# Patient Record
Sex: MALE
[De-identification: names, ages, dates, MRNs, and addresses within clinical notes are randomized per-mention and may not be internally consistent; named-entity substitution may affect disease eponyms.]

---

## 2018-11-12 ENCOUNTER — HOSPITAL ENCOUNTER (OUTPATIENT)
Dept: HOSPITAL 89 - OR | Age: 53
Discharge: HOME | End: 2018-11-12
Attending: UROLOGY
Payer: COMMERCIAL

## 2018-11-12 VITALS — BODY MASS INDEX: 24.29 KG/M2 | WEIGHT: 164 LBS | HEIGHT: 69 IN

## 2018-11-12 VITALS — DIASTOLIC BLOOD PRESSURE: 101 MMHG | SYSTOLIC BLOOD PRESSURE: 159 MMHG

## 2018-11-12 DIAGNOSIS — N20.0: Primary | ICD-10-CM

## 2018-11-12 PROCEDURE — 50590 FRAGMENTING OF KIDNEY STONE: CPT

## 2018-11-13 NOTE — OPERATIVE REPORT 1
EVENT DATE: November 12, 2018 

SURGEON: Teo Siddiqui MD 

ANESTHESIOLOGIST: Kartik Cain MD 

ANESTHESIA: General.  





PREOPERATIVE DIAGNOSIS  

Right renal lithiasis x3.  



POSTOPERATIVE DIAGNOSIS 

Right renal lithiasis x3.  



PROCEDURE PERFORMED 

Right extracorporeal shockwave lithotripsy with treatment of three stone foci.  



DESCRIPTION OF PROCEDURE 

Under general anesthetic, the patient was positioned for right extracorporeal 

shockwave lithotripsy.  



After localization by fluoroscopy, the approximately 5 mm stone in the inferior 

pole area of the right kidney was treated with a total of approximately 500 

shocks, and IV contrast was administered to light up the kidney collecting 

system.  



That was accomplished.  



The contrast showed treatment in the appropriate area for the largest stone 

located in the inferior pole area of the right kidney.  



A total of 1500 shocks were administered to the stone.  



After administration of the contrast media and confirmation of the inferior pole

area, the smaller stones in the upper pole and midpole area of the right kidney 

were treated with a total of 1500 shocks, progressing from low to high kV fairly

rapidly.  



Patient tolerated the procedure satisfactorily and returned to the recovery room

in satisfactory condition.  



This is a 53-year-old white male complaining of right flank pain on 2 November 2018.  CT showed right renal lithiasis x3 with no associated hydronephrosis.  



Patient believes that he passed the stone.  The emergency room told him he 

probably had a viral gastroenteritis.  Urine culture showed regrowth.  



Urinalysis in the office showed no blood on 6 November 2018.  



Historically, Dr. Coles, urologist in [place], treated him for a stone at the 

hospital in [place].  



Patient had a great deal of difficulty tolerating his stent.  



Patient's white count at that time, on 2 November, was 7100.  GFR was greater 

than 60.  Calcium was 9.7.  



Patient was seen in the emergency room at [facility].  



His x-rays at that time, CT-IVP without contrast, were reviewed x2 with 

radiologist at our facility.  



Preoperatively, the x-ray again was reviewed and confirmed the stone in the 

inferior pole area on the right and in the mid-upper pole right and superior 

pole on the right.  



Preoperatively, options were discussed with the patient.  He adamantly refused 

external placement and removal.  



Patient was cautioned about passing possibly a large stone particle that may 

give him significant ureteral/renal colic.  He seemed to understand.



Reviewed that with the patient several times preoperatively.



He adamantly refused external stent and removal.  



The option of IV contrast was discussed with the patient, and he was agreeable 

to that modality of therapy if needed.  That has been accomplished.  See 

operative note for details.  



Patient will be ready for discharge home when alert and functional, to force 

fluids to 12 glasses of water per day.  Activities are as tolerated.  He is to 

strain all his urine.  He was sent home with strainers.  



Plan followup on 27 November 2018.  He is to call for an appointment.  



Patient is to percuss his right kidney frequently to facilitate passage of stone

particles.  



Copy of instructions for renal percussion was given to the patient.  



Patient was sent home with a copy of overall instructions, and he is to continue

his usual medications.  



Patient was given my personal cell phone number to call me if there are any 

problems, or to the emergency room if unable to reach me.  



Plan KUB on 27 November 2018, prior to my seeing him in the clinic on that day. 





Patient will be discharged home on Keflex, Norco, and Motrin therapy.  He is to 

continue his usual medications.  

TEO